# Patient Record
Sex: FEMALE | URBAN - METROPOLITAN AREA
[De-identification: names, ages, dates, MRNs, and addresses within clinical notes are randomized per-mention and may not be internally consistent; named-entity substitution may affect disease eponyms.]

---

## 2021-06-08 ENCOUNTER — OFFICE VISIT (OUTPATIENT)
Dept: URBAN - METROPOLITAN AREA CLINIC 56 | Facility: CLINIC | Age: 36
End: 2021-06-08
Payer: COMMERCIAL

## 2021-06-08 PROCEDURE — 99204 OFFICE O/P NEW MOD 45 MIN: CPT | Performed by: OPTOMETRIST

## 2021-06-08 ASSESSMENT — INTRAOCULAR PRESSURE
OS: 20
OD: 32

## 2021-06-08 NOTE — IMPRESSION/PLAN
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
RTC 1 day

## 2021-06-09 ENCOUNTER — OFFICE VISIT (OUTPATIENT)
Dept: URBAN - METROPOLITAN AREA CLINIC 56 | Facility: CLINIC | Age: 36
End: 2021-06-09
Payer: COMMERCIAL

## 2021-06-09 DIAGNOSIS — H40.41X4 GLAUCOMA OF RIGHT EYE SECONDARY TO EYE INFLAMMATION, INDETERMINATE STAGE: Primary | ICD-10-CM

## 2021-06-09 PROCEDURE — 99214 OFFICE O/P EST MOD 30 MIN: CPT | Performed by: OPTOMETRIST

## 2021-06-09 RX ORDER — PREDNISOLONE ACETATE 10 MG/ML
1 % SUSPENSION/ DROPS OPHTHALMIC
Qty: 5 | Refills: 0 | Status: ACTIVE
Start: 2021-06-09

## 2021-06-09 ASSESSMENT — INTRAOCULAR PRESSURE
OD: 16
OS: 22
OD: 21

## 2021-06-09 NOTE — IMPRESSION/PLAN
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 Continue Brimonidine TID and PF Dorzolamide/Timolol BID OD Start  Pred Forte QID OD only. RTC on Friday for follow up or sooner if worsens.

## 2021-06-11 ENCOUNTER — OFFICE VISIT (OUTPATIENT)
Dept: URBAN - METROPOLITAN AREA CLINIC 56 | Facility: CLINIC | Age: 36
End: 2021-06-11
Payer: COMMERCIAL

## 2021-06-11 PROCEDURE — 99213 OFFICE O/P EST LOW 20 MIN: CPT | Performed by: OPTOMETRIST

## 2021-06-11 ASSESSMENT — INTRAOCULAR PRESSURE
OD: 24
OS: 17

## 2021-06-11 NOTE — IMPRESSION/PLAN
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
- Myah Schlossman Syndrome Plan: Improving. Continue Brimonidine TID and PF Dorzolamide/Timolol BID OD Continue Pred Forte QID OD only. RTC 1 week for follow up or sooner if symptoms worsen.

## 2021-06-22 ENCOUNTER — OFFICE VISIT (OUTPATIENT)
Dept: URBAN - METROPOLITAN AREA CLINIC 56 | Facility: CLINIC | Age: 36
End: 2021-06-22
Payer: COMMERCIAL

## 2021-06-22 PROCEDURE — 99213 OFFICE O/P EST LOW 20 MIN: CPT | Performed by: OPTOMETRIST

## 2021-06-22 ASSESSMENT — INTRAOCULAR PRESSURE
OD: 20
OS: 18

## 2021-06-22 NOTE — IMPRESSION/PLAN
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
- Myah Schlossman Syndrome Plan: Significant improvement in inflammation and IOP. Continue Brimonidine TID OU for 3 weeks then stop. Finish PF Dorzolamide/Timolol BID OD (has ~ 1 week) Decrease Pred Forte to TID for 1 week, BID for 1 week then QD for 1 week then stop. RTC 5-6 weeks for follow up or sooner if problem returns.